# Patient Record
Sex: FEMALE | Race: WHITE | NOT HISPANIC OR LATINO
[De-identification: names, ages, dates, MRNs, and addresses within clinical notes are randomized per-mention and may not be internally consistent; named-entity substitution may affect disease eponyms.]

---

## 2018-12-26 ENCOUNTER — TRANSCRIPTION ENCOUNTER (OUTPATIENT)
Age: 53
End: 2018-12-26

## 2019-01-03 ENCOUNTER — TRANSCRIPTION ENCOUNTER (OUTPATIENT)
Age: 54
End: 2019-01-03

## 2022-08-02 ENCOUNTER — APPOINTMENT (OUTPATIENT)
Dept: OBGYN | Facility: CLINIC | Age: 57
End: 2022-08-02

## 2022-08-02 VITALS
HEIGHT: 70 IN | SYSTOLIC BLOOD PRESSURE: 147 MMHG | HEART RATE: 58 BPM | WEIGHT: 253 LBS | BODY MASS INDEX: 36.22 KG/M2 | DIASTOLIC BLOOD PRESSURE: 84 MMHG

## 2022-08-02 DIAGNOSIS — Z00.00 ENCOUNTER FOR GENERAL ADULT MEDICAL EXAMINATION W/OUT ABNORMAL FINDINGS: ICD-10-CM

## 2022-08-02 PROCEDURE — 99396 PREV VISIT EST AGE 40-64: CPT

## 2022-08-02 NOTE — PLAN
[FreeTextEntry1] : Patient is a 57-year-old  4 para 3-0-1-3 last menstrual period \par Patient presents for annual visit,,, complaining of vaginal irritation over the past 1 week\par Physical exam reveals a well-developed well-nourished female morbidly obese,,, BMI 36\par Heart regular rhythm and rate, lungs clear, breast no mass nontender no skin change or nipple discharge no adenopathy, abdomen soft nontender no organomegaly.\par Pelvic exam shows normal female external genitalia atrophic, vagina no lesions atrophic, scant minimal clear discharge, cervix appropriate size nontender, uterus anteverted normal size nontender, adnexa no mass nontender.\par Vaginal culture performed\par Pap smear performed\par Prescription for mammogram bone density given\par Patient will be schedule colonoscopy\par Benign exam\par Will treat after vaginal culture results have been available

## 2022-08-02 NOTE — PHYSICAL EXAM
[Appropriately responsive] : appropriately responsive [Alert] : alert [No Acute Distress] : no acute distress [No Lymphadenopathy] : no lymphadenopathy [Regular Rate Rhythm] : regular rate rhythm [No Murmurs] : no murmurs [Clear to Auscultation B/L] : clear to auscultation bilaterally [Soft] : soft [Non-tender] : non-tender [Non-distended] : non-distended [No HSM] : No HSM [No Mass] : no mass [Oriented x3] : oriented x3 [Examination Of The Breasts] : a normal appearance [No Masses] : no breast masses were palpable [Vulvar Atrophy] : vulvar atrophy [Labia Majora] : normal [Labia Minora] : normal [Atrophy] : atrophy [Discharge] : discharge [Scant] : scant [Clear] : clear [Thin] : thin [Normal] : normal [Anteversion] : anteverted [Uterine Adnexae] : normal [FreeTextEntry6] : No masses, nontender, no skin changes, no adenopathy, no nipple discharge [Foul Smelling] : not foul smelling [Tenderness] : nontender [Mass ___ cm] : no uterine mass was palpated

## 2022-08-02 NOTE — HISTORY OF PRESENT ILLNESS
[FreeTextEntry1] : Patient is a 57-year-old  4 para 3-0-1-3 last menstrual period \par Patient presents for annual visit,,, complaints of vaginal itching over the past 1 week

## 2022-08-08 LAB
A VAGINAE DNA VAG QL NAA+PROBE: NORMAL
BVAB2 DNA VAG QL NAA+PROBE: NORMAL
C KRUSEI DNA VAG QL NAA+PROBE: NEGATIVE
HPV HIGH+LOW RISK DNA PNL CVX: NOT DETECTED
MEGA1 DNA VAG QL NAA+PROBE: NORMAL
T VAGINALIS RRNA SPEC QL NAA+PROBE: NEGATIVE

## 2023-06-03 ENCOUNTER — OFFICE (OUTPATIENT)
Dept: URBAN - METROPOLITAN AREA CLINIC 1 | Facility: CLINIC | Age: 58
Setting detail: OPHTHALMOLOGY
End: 2023-06-03
Payer: COMMERCIAL

## 2023-06-03 DIAGNOSIS — H43.812: ICD-10-CM

## 2023-06-03 DIAGNOSIS — D31.32: ICD-10-CM

## 2023-06-03 PROCEDURE — 92004 COMPRE OPH EXAM NEW PT 1/>: CPT

## 2023-06-03 PROCEDURE — 92250 FUNDUS PHOTOGRAPHY W/I&R: CPT

## 2023-06-03 ASSESSMENT — REFRACTION_CURRENTRX
OD_OVR_VA: 20/
OD_VPRISM_DIRECTION: SV
OS_SPHERE: +0.50
OS_AXIS: 109
OS_ADD: +2.25
OS_OVR_VA: 20/
OD_ADD: +2.25
OS_CYLINDER: -0.50
OD_SPHERE: +0.50
OD_CYLINDER: 0.00
OS_VPRISM_DIRECTION: SV
OD_AXIS: 000

## 2023-06-03 ASSESSMENT — REFRACTION_AUTOREFRACTION
OD_AXIS: 082
OS_SPHERE: +0.75
OD_CYLINDER: -0.25
OS_CYLINDER: -0.25
OD_SPHERE: +0.50
OS_AXIS: 102

## 2023-06-03 ASSESSMENT — AXIALLENGTH_DERIVED
OD_AL: 23.4921
OS_AL: 23.2613

## 2023-06-03 ASSESSMENT — KERATOMETRY
OD_K1POWER_DIOPTERS: 43.00
OS_K1POWER_DIOPTERS: 43.50
OD_AXISANGLE_DEGREES: 108
OS_K2POWER_DIOPTERS: 44.00
OD_K2POWER_DIOPTERS: 43.75
OS_AXISANGLE_DEGREES: 074

## 2023-06-03 ASSESSMENT — CONFRONTATIONAL VISUAL FIELD TEST (CVF)
OS_FINDINGS: FULL
OD_FINDINGS: FULL

## 2023-06-03 ASSESSMENT — VISUAL ACUITY
OS_BCVA: 20/20-1
OD_BCVA: 20/20-2

## 2023-06-03 ASSESSMENT — TONOMETRY
OS_IOP_MMHG: 15
OD_IOP_MMHG: 15

## 2023-06-03 ASSESSMENT — SPHEQUIV_DERIVED
OD_SPHEQUIV: 0.375
OS_SPHEQUIV: 0.625

## 2023-07-16 ENCOUNTER — OFFICE (OUTPATIENT)
Dept: URBAN - METROPOLITAN AREA CLINIC 1 | Facility: CLINIC | Age: 58
Setting detail: OPHTHALMOLOGY
End: 2023-07-16
Payer: COMMERCIAL

## 2023-07-16 DIAGNOSIS — H43.812: ICD-10-CM

## 2023-07-16 DIAGNOSIS — D31.32: ICD-10-CM

## 2023-07-16 PROCEDURE — 99213 OFFICE O/P EST LOW 20 MIN: CPT

## 2023-07-16 ASSESSMENT — REFRACTION_AUTOREFRACTION
OD_AXIS: 080
OS_SPHERE: +0.75
OD_SPHERE: +0.50
OD_CYLINDER: -0.25
OS_AXIS: 115
OS_CYLINDER: -0.25

## 2023-07-16 ASSESSMENT — KERATOMETRY
OD_K2POWER_DIOPTERS: 43.75
OD_K1POWER_DIOPTERS: 43.25
OD_AXISANGLE_DEGREES: 105
OS_K2POWER_DIOPTERS: 44.25
OS_K1POWER_DIOPTERS: 43.50
METHOD_AUTO_MANUAL: AUTO
OS_AXISANGLE_DEGREES: 080

## 2023-07-16 ASSESSMENT — CONFRONTATIONAL VISUAL FIELD TEST (CVF)
OS_FINDINGS: FULL
OD_FINDINGS: FULL

## 2023-07-16 ASSESSMENT — REFRACTION_CURRENTRX
OD_OVR_VA: 20/
OS_ADD: +2.25
OD_CYLINDER: 0.00
OS_CYLINDER: -0.50
OD_ADD: +2.25
OD_AXIS: 000
OS_AXIS: 109
OS_SPHERE: +0.50
OD_VPRISM_DIRECTION: SV
OS_VPRISM_DIRECTION: SV
OS_OVR_VA: 20/
OD_SPHERE: +0.50

## 2023-07-16 ASSESSMENT — VISUAL ACUITY
OD_BCVA: 20/20-1
OS_BCVA: 20/20-1

## 2023-07-16 ASSESSMENT — TONOMETRY
OS_IOP_MMHG: 14
OD_IOP_MMHG: 16

## 2023-07-16 ASSESSMENT — AXIALLENGTH_DERIVED
OD_AL: 23.4467
OS_AL: 23.2167

## 2023-07-16 ASSESSMENT — SPHEQUIV_DERIVED
OS_SPHEQUIV: 0.625
OD_SPHEQUIV: 0.375

## 2023-08-03 ENCOUNTER — APPOINTMENT (OUTPATIENT)
Dept: OBGYN | Facility: CLINIC | Age: 58
End: 2023-08-03
Payer: COMMERCIAL

## 2023-08-03 VITALS
BODY MASS INDEX: 35.79 KG/M2 | DIASTOLIC BLOOD PRESSURE: 82 MMHG | WEIGHT: 250 LBS | HEART RATE: 55 BPM | HEIGHT: 70 IN | SYSTOLIC BLOOD PRESSURE: 135 MMHG

## 2023-08-03 DIAGNOSIS — Z12.4 ENCOUNTER FOR SCREENING FOR MALIGNANT NEOPLASM OF CERVIX: ICD-10-CM

## 2023-08-03 DIAGNOSIS — Z12.31 ENCOUNTER FOR SCREENING MAMMOGRAM FOR MALIGNANT NEOPLASM OF BREAST: ICD-10-CM

## 2023-08-03 DIAGNOSIS — Z01.419 ENCOUNTER FOR GYNECOLOGICAL EXAMINATION (GENERAL) (ROUTINE) W/OUT ABNORMAL FINDINGS: ICD-10-CM

## 2023-08-03 DIAGNOSIS — Z11.51 ENCOUNTER FOR SCREENING FOR HUMAN PAPILLOMAVIRUS (HPV): ICD-10-CM

## 2023-08-03 PROCEDURE — 99396 PREV VISIT EST AGE 40-64: CPT

## 2023-08-03 NOTE — HISTORY OF PRESENT ILLNESS
[FreeTextEntry1] : Patient is a 58-year-old  4 para 3-0-1-3 last menstrual period approximately 5 years prior Patient presents for annual visit,, denies any complaints

## 2023-08-03 NOTE — PLAN
[FreeTextEntry1] : Patient is a 58-year-old  4 para 3-0-1-3 last menstrual approximately 5 years prior Patient presents for annual visit,, denies any complaints Physical exam reveals a well-developed well-nourished female no apparent distress,, BMI 35 Heart regular rhythm and rate, lungs clear, breast no mass nontender no skin change or nipple discharge no adenopathy, abdomen soft nontender no organomegaly. Pelvic Anatoly shows normal female external genitalia, vagina no lesions, cervix appropriate size nontender, uterus anteverted normal size nontender, adnexa no mass nontender. Pap smear was performed Patient will be given a prescription for breast mammogram Patient also states she had a colonoscopy performed in July of this year with negative findings Essentially benign GYN exam Follow-up 1 year or prior to that as needed  Fariha was present as a chaperone for the entire assessment and examination of this patient

## 2023-08-10 LAB — HPV HIGH+LOW RISK DNA PNL CVX: NOT DETECTED

## 2023-11-04 ENCOUNTER — OFFICE (OUTPATIENT)
Dept: URBAN - METROPOLITAN AREA CLINIC 1 | Facility: CLINIC | Age: 58
Setting detail: OPHTHALMOLOGY
End: 2023-11-04
Payer: COMMERCIAL

## 2023-11-04 DIAGNOSIS — D31.32: ICD-10-CM

## 2023-11-04 DIAGNOSIS — H43.812: ICD-10-CM

## 2023-11-04 PROBLEM — H52.7 REFRACTIVE ERROR ; BOTH EYES: Status: ACTIVE | Noted: 2023-11-04

## 2023-11-04 PROCEDURE — 99213 OFFICE O/P EST LOW 20 MIN: CPT

## 2023-11-04 ASSESSMENT — REFRACTION_AUTOREFRACTION
OS_AXIS: 097
OS_SPHERE: +1.25
OD_AXIS: 081
OS_CYLINDER: -0.50
OD_SPHERE: +0.50
OD_CYLINDER: -0.25

## 2023-11-04 ASSESSMENT — REFRACTION_CURRENTRX
OD_VPRISM_DIRECTION: SV
OD_ADD: +2.00
OS_CYLINDER: -0.50
OS_SPHERE: +2.75
OS_VPRISM_DIRECTION: SV
OD_CYLINDER: SPH
OS_AXIS: 105
OS_ADD: +2.00
OD_SPHERE: +2.50
OD_VPRISM_DIRECTION: SV
OD_OVR_VA: 20/
OS_OVR_VA: 20/
OD_OVR_VA: 20/
OS_CYLINDER: -0.75
OD_SPHERE: +0.25
OD_CYLINDER: SPH
OS_VPRISM_DIRECTION: SV
OS_AXIS: 110
OS_SPHERE: +0.75
OS_OVR_VA: 20/

## 2023-11-04 ASSESSMENT — REFRACTION_MANIFEST
OD_CYLINDER: SPH
OS_AXIS: 100
OS_CYLINDER: -0.50
OD_ADD: +2.00
OD_SPHERE: +0.50
OS_SPHERE: +0.75
OS_ADD: +2.00

## 2023-11-04 ASSESSMENT — SPHEQUIV_DERIVED
OS_SPHEQUIV: 0.5
OD_SPHEQUIV: 0.375
OS_SPHEQUIV: 1

## 2023-11-04 ASSESSMENT — CONFRONTATIONAL VISUAL FIELD TEST (CVF)
OD_FINDINGS: FULL
OS_FINDINGS: FULL

## 2024-04-21 ENCOUNTER — OFFICE (OUTPATIENT)
Dept: URBAN - METROPOLITAN AREA CLINIC 1 | Facility: CLINIC | Age: 59
Setting detail: OPHTHALMOLOGY
End: 2024-04-21
Payer: COMMERCIAL

## 2024-04-21 DIAGNOSIS — H52.4: ICD-10-CM

## 2024-04-21 DIAGNOSIS — H43.811: ICD-10-CM

## 2024-04-21 DIAGNOSIS — D31.32: ICD-10-CM

## 2024-04-21 PROBLEM — Q14.1 CHRPE LESION: Status: ACTIVE | Noted: 2024-04-21

## 2024-04-21 PROCEDURE — 92015 DETERMINE REFRACTIVE STATE: CPT

## 2024-04-21 PROCEDURE — 92014 COMPRE OPH EXAM EST PT 1/>: CPT

## 2024-04-21 PROCEDURE — 92250 FUNDUS PHOTOGRAPHY W/I&R: CPT

## 2024-08-06 ENCOUNTER — APPOINTMENT (OUTPATIENT)
Dept: OBGYN | Facility: CLINIC | Age: 59
End: 2024-08-06

## 2024-09-10 ENCOUNTER — APPOINTMENT (OUTPATIENT)
Dept: OBGYN | Facility: CLINIC | Age: 59
End: 2024-09-10
Payer: COMMERCIAL

## 2024-09-10 VITALS
DIASTOLIC BLOOD PRESSURE: 84 MMHG | HEIGHT: 70 IN | WEIGHT: 264 LBS | BODY MASS INDEX: 37.8 KG/M2 | SYSTOLIC BLOOD PRESSURE: 140 MMHG | HEART RATE: 75 BPM

## 2024-09-10 DIAGNOSIS — Z01.419 ENCOUNTER FOR GYNECOLOGICAL EXAMINATION (GENERAL) (ROUTINE) W/OUT ABNORMAL FINDINGS: ICD-10-CM

## 2024-09-10 DIAGNOSIS — Z12.4 ENCOUNTER FOR SCREENING FOR MALIGNANT NEOPLASM OF CERVIX: ICD-10-CM

## 2024-09-10 DIAGNOSIS — Z12.39 ENCOUNTER FOR OTHER SCREENING FOR MALIGNANT NEOPLASM OF BREAST: ICD-10-CM

## 2024-09-10 DIAGNOSIS — Z11.51 ENCOUNTER FOR SCREENING FOR HUMAN PAPILLOMAVIRUS (HPV): ICD-10-CM

## 2024-09-10 DIAGNOSIS — R92.323 MAMMOGRAPHIC FIBROGLANDULAR DENSITY, BILATERAL BREASTS: ICD-10-CM

## 2024-09-10 PROCEDURE — 99459 PELVIC EXAMINATION: CPT

## 2024-09-10 PROCEDURE — 99396 PREV VISIT EST AGE 40-64: CPT
